# Patient Record
Sex: FEMALE | Race: WHITE | NOT HISPANIC OR LATINO | Employment: FULL TIME | URBAN - NONMETROPOLITAN AREA
[De-identification: names, ages, dates, MRNs, and addresses within clinical notes are randomized per-mention and may not be internally consistent; named-entity substitution may affect disease eponyms.]

---

## 2022-02-04 ENCOUNTER — OFFICE VISIT (OUTPATIENT)
Dept: URGENT CARE | Facility: CLINIC | Age: 42
End: 2022-02-04
Payer: COMMERCIAL

## 2022-02-04 VITALS
SYSTOLIC BLOOD PRESSURE: 134 MMHG | HEART RATE: 69 BPM | TEMPERATURE: 98.3 F | BODY MASS INDEX: 32.88 KG/M2 | WEIGHT: 222 LBS | DIASTOLIC BLOOD PRESSURE: 92 MMHG | HEIGHT: 69 IN | OXYGEN SATURATION: 98 %

## 2022-02-04 DIAGNOSIS — S09.301A INJURY OF TYMPANIC MEMBRANE OF RIGHT EAR, INITIAL ENCOUNTER: Primary | ICD-10-CM

## 2022-02-04 PROCEDURE — 99202 OFFICE O/P NEW SF 15 MIN: CPT | Performed by: PHYSICIAN ASSISTANT

## 2022-02-04 RX ORDER — AMOXICILLIN 400 MG/5ML
POWDER, FOR SUSPENSION ORAL
Qty: 140 ML | Refills: 0 | Status: SHIPPED | OUTPATIENT
Start: 2022-02-04 | End: 2022-02-11

## 2022-02-04 NOTE — PATIENT INSTRUCTIONS
Ruptured Eardrum   WHAT YOU NEED TO KNOW:   A ruptured eardrum is a tear or hole in your eardrum  DISCHARGE INSTRUCTIONS:   Medicines:   · Antibiotic ear drops  may be needed to treat or prevent an infection caused by bacteria  · Take your medicine as directed  Contact your healthcare provider if you think your medicine is not helping or if you have side effects  Tell him or her if you are allergic to any medicine  Keep a list of the medicines, vitamins, and herbs you take  Include the amounts, and when and why you take them  Bring the list or the pill bottles to follow-up visits  Carry your medicine list with you in case of an emergency  Follow up with your healthcare provider as directed:  Write down your questions so you remember to ask them during your visits  Self-care:   · Always keep your ear dry  Do not let your ear get wet, such as when bathing or swimming  Water may cause your damaged eardrum to heal more slowly and increase your risk for infection  · Do not put anything in your ear  Never put objects such as cotton swabs in your ear  Pointed objects may damage or worsen the damage to your eardrum  · Try not to blow your nose  The increase in pressure may cause further damage to your eardrum  Contact your healthcare provider if:   · You have a fever  · Your hearing loss gets worse  · You feel increased dizziness, or you are vomiting  · You have worsening ear pain or a new buzzing sound in your ear  · Your symptoms do not improve, even after you take your medicine  · You have questions or concerns about your condition or care  Return to the emergency department if:   · You are bleeding from your ear  · You cannot move or feel areas of your face  © Copyright [address] 2021 Information is for End User's use only and may not be sold, redistributed or otherwise used for commercial purposes   All illustrations and images included in CareNotes® are the copyrighted property of A D A M , Inc  or [facility] Ramon Norris   The above information is an  only  It is not intended as medical advice for individual conditions or treatments  Talk to your doctor, nurse or pharmacist before following any medical regimen to see if it is safe and effective for you

## 2022-02-04 NOTE — PROGRESS NOTES
[facility]        NAME: Oksana Willis is a 37 y o  female  : 1980    MRN: [id number]  DATE: 2022  TIME: 9:32 AM    Assessment and Plan   Injury of tympanic membrane of right ear, initial encounter [S09 301A]  1  Injury of tympanic membrane of right ear, initial encounter  amoxicillin (AMOXIL) 400 MG/5ML suspension         Patient Instructions     Start Amoxicillin to prevent infection  Follow up with ENT  Follow up with PCP in 3-5 days  Proceed to  ER if symptoms worsen  Chief Complaint     Chief Complaint   Patient presents with    Earache         History of Present Illness       Patient was cleaning her ears this morning when her cat jumped on her and pushed the Q-tip further into her right ear  Having pain and bloody drainage  Hx of previous perforation with surgical repair  Review of Systems   Review of Systems   Constitutional: Negative for chills and fever  HENT: Positive for ear discharge, ear pain and hearing loss  Current Medications       Current Outpatient Medications:     amoxicillin (AMOXIL) 400 MG/5ML suspension, 10 ml ever 12 hrs x 7 days, Disp: 140 mL, Rfl: 0    Current Allergies     Allergies as of 2022 - never reviewed   Allergen Reaction Noted    Ciprofloxacin Palpitations 10/02/2019            The following portions of the patient's history were reviewed and updated as appropriate: allergies, current medications, past family history, past medical history, past social history, past surgical history and problem list      Past Medical History:   Diagnosis Date    Anemia     Anxiety     Depression        Past Surgical History:   Procedure Laterality Date     SECTION      EAR SURGERY      HYSTERECTOMY         Family History   Problem Relation Age of Onset    COPD Mother     No Known Problems Father          Medications have been verified          Objective   /92   Pulse 69   Temp 98 3 °F (36 8 °C)   Ht 5' 9" (1 753 m)   Wt 101 kg (222 lb)   SpO2 98%   BMI 32 78 kg/m²   No LMP recorded  Physical Exam     Physical Exam  Vitals and nursing note reviewed  Constitutional:       Appearance: Normal appearance  HENT:      Head: Normocephalic and atraumatic  Left Ear: Tympanic membrane and ear canal normal       Ears:      Comments: Bloody drainage at entrance of right canal   TM partially obscured secondary to bloody drainage  Unable to visualize TM perforation, however, very likely given bloody drainage against TM  Mouth/Throat:      Mouth: Mucous membranes are moist    Eyes:      Conjunctiva/sclera: Conjunctivae normal    Cardiovascular:      Rate and Rhythm: Normal rate and regular rhythm  Pulmonary:      Effort: Pulmonary effort is normal    Skin:     General: Skin is warm  Neurological:      Mental Status: She is alert

## 2022-05-15 ENCOUNTER — OFFICE VISIT (OUTPATIENT)
Dept: URGENT CARE | Facility: CLINIC | Age: 42
End: 2022-05-15
Payer: COMMERCIAL

## 2022-05-15 VITALS
BODY MASS INDEX: 30.78 KG/M2 | HEART RATE: 88 BPM | WEIGHT: 215 LBS | OXYGEN SATURATION: 96 % | SYSTOLIC BLOOD PRESSURE: 110 MMHG | DIASTOLIC BLOOD PRESSURE: 70 MMHG | HEIGHT: 70 IN | RESPIRATION RATE: 15 BRPM | TEMPERATURE: 98.1 F

## 2022-05-15 DIAGNOSIS — J06.9 ACUTE URI: ICD-10-CM

## 2022-05-15 DIAGNOSIS — H60.391 OTHER INFECTIVE ACUTE OTITIS EXTERNA OF RIGHT EAR: ICD-10-CM

## 2022-05-15 DIAGNOSIS — H66.011 NON-RECURRENT ACUTE SUPPURATIVE OTITIS MEDIA OF RIGHT EAR WITH SPONTANEOUS RUPTURE OF TYMPANIC MEMBRANE: Primary | ICD-10-CM

## 2022-05-15 PROCEDURE — 99213 OFFICE O/P EST LOW 20 MIN: CPT

## 2022-05-15 RX ORDER — AMOXICILLIN AND CLAVULANATE POTASSIUM 875; 125 MG/1; MG/1
1 TABLET, FILM COATED ORAL EVERY 12 HOURS SCHEDULED
Qty: 14 TABLET | Refills: 0 | Status: SHIPPED | OUTPATIENT
Start: 2022-05-15 | End: 2022-05-22

## 2022-05-15 RX ORDER — OMEPRAZOLE 20 MG/1
20 CAPSULE, DELAYED RELEASE ORAL DAILY
COMMUNITY
Start: 2022-03-31 | End: 2023-03-31

## 2022-05-15 NOTE — PROGRESS NOTES
[facility]        NAME: London Hemphill is a 40 y o  female  : 1980    MRN: [id number]  DATE: May 15, 2022  TIME: 3:19 PM    Assessment and Plan   Non-recurrent acute suppurative otitis media of right ear with spontaneous rupture of tympanic membrane [H66 011]  1  Non-recurrent acute suppurative otitis media of right ear with spontaneous rupture of tympanic membrane  amoxicillin-clavulanate (AUGMENTIN) 875-125 mg per tablet    Ambulatory Referral to Otolaryngology   2  Other infective acute otitis externa of right ear  amoxicillin-clavulanate (AUGMENTIN) 875-125 mg per tablet    Ambulatory Referral to Otolaryngology   3  Acute URI       Viral symptoms most likely URI  Will treat otitis externa and otitis media with Augmentin twice a day for 7 days  Patient is allergic to ciprofloxacin thus will hold ofloxacin ear drops  Referral placed for ENT as requested  Patient Instructions   Take augmentin twice a day for 7 days  Avoid water or any objects in the ear  Follow with ENT  Follow up with PCP in 3-5 days  Proceed to  ER if symptoms worsen  Chief Complaint     Chief Complaint   Patient presents with    Nasal Congestion    Cough     1 week         History of Present Illness       Patient is a 40year old female who presents to the office today for 1 week of sinus congestion that has gotten worse with a cough and now head pressure and body aches  Does admit to working 60 hours this past week  States that the headache started 3 days ago and the coughing started last night  She also admits to ear pain  States that she did rupture her ear drum on the right few weeks ago and presented for evaluation but was unable to follow with ENT related to insurance issues  Requesting new referral for ENT  Review of Systems   Review of Systems   Constitutional: Positive for fatigue  Negative for activity change, appetite change, chills and fever     HENT: Positive for congestion, ear pain, postnasal drip and sore throat  Negative for ear discharge, facial swelling, rhinorrhea, sinus pressure and sinus pain  Respiratory: Positive for cough  Negative for shortness of breath and wheezing  Cardiovascular: Negative for chest pain and palpitations  Gastrointestinal: Negative for diarrhea, nausea and vomiting  Musculoskeletal: Positive for myalgias  Neurological: Positive for headaches  All other systems reviewed and are negative  Current Medications       Current Outpatient Medications:     amoxicillin-clavulanate (AUGMENTIN) 875-125 mg per tablet, Take 1 tablet by mouth every 12 (twelve) hours for 7 days, Disp: 14 tablet, Rfl: 0    omeprazole (PriLOSEC) 20 mg delayed release capsule, Take 20 mg by mouth in the morning  (Patient not taking: Reported on 5/15/2022), Disp: , Rfl:     Current Allergies     Allergies as of 05/15/2022 - Reviewed 05/15/2022   Allergen Reaction Noted    Cynara scolymus (artichoke) Allergic Rhinitis 10/02/2019    Ciprofloxacin Palpitations 10/02/2019            The following portions of the patient's history were reviewed and updated as appropriate: allergies, current medications, past family history, past medical history, past social history, past surgical history and problem list      Past Medical History:   Diagnosis Date    Anemia     Anxiety     Depression        Past Surgical History:   Procedure Laterality Date     SECTION      EAR SURGERY      HYSTERECTOMY         Family History   Problem Relation Age of Onset    COPD Mother     No Known Problems Father          Medications have been verified  Objective   /70   Pulse 88   Temp 98 1 °F (36 7 °C)   Resp 15   Ht 5' 9 88" (1 775 m)   Wt 97 5 kg (215 lb)   SpO2 96%   BMI 30 95 kg/m²        Physical Exam     Physical Exam  Vitals and nursing note reviewed  Constitutional:       General: She is not in acute distress  Appearance: Normal appearance   She is normal weight  She is not ill-appearing or toxic-appearing  HENT:      Right Ear: Drainage (purulent) and swelling present  Tympanic membrane is perforated and erythematous  Left Ear: Hearing and tympanic membrane normal       Ears:      Comments: Right ear canal swollen     Nose: Congestion present  Right Turbinates: Enlarged and swollen  Left Turbinates: Enlarged and swollen  Mouth/Throat:      Pharynx: Posterior oropharyngeal erythema present  Comments: Posterior pharynx erythema noted without swelling or exudate  Cardiovascular:      Rate and Rhythm: Normal rate and regular rhythm  Pulses: Normal pulses  Heart sounds: Normal heart sounds  No murmur heard  No friction rub  No gallop  Pulmonary:      Effort: Pulmonary effort is normal  No respiratory distress  Breath sounds: Normal breath sounds  No stridor  No wheezing, rhonchi or rales  Chest:      Chest wall: No tenderness  Lymphadenopathy:      Cervical: No cervical adenopathy  Skin:     General: Skin is warm and dry  Capillary Refill: Capillary refill takes less than 2 seconds  Neurological:      General: No focal deficit present  Mental Status: She is alert

## 2022-05-16 ENCOUNTER — TELEPHONE (OUTPATIENT)
Dept: OTOLARYNGOLOGY | Facility: CLINIC | Age: 42
End: 2022-05-16

## 2022-05-16 ENCOUNTER — OFFICE VISIT (OUTPATIENT)
Dept: URGENT CARE | Facility: CLINIC | Age: 42
End: 2022-05-16
Payer: COMMERCIAL

## 2022-05-16 VITALS
TEMPERATURE: 98.2 F | HEART RATE: 89 BPM | DIASTOLIC BLOOD PRESSURE: 97 MMHG | SYSTOLIC BLOOD PRESSURE: 137 MMHG | RESPIRATION RATE: 20 BRPM | OXYGEN SATURATION: 96 %

## 2022-05-16 DIAGNOSIS — R05.9 COUGH: Primary | ICD-10-CM

## 2022-05-16 DIAGNOSIS — R52 BODY ACHES: ICD-10-CM

## 2022-05-16 DIAGNOSIS — U07.1 COVID-19: Primary | ICD-10-CM

## 2022-05-16 PROCEDURE — 99213 OFFICE O/P EST LOW 20 MIN: CPT

## 2022-05-16 PROCEDURE — U0003 INFECTIOUS AGENT DETECTION BY NUCLEIC ACID (DNA OR RNA); SEVERE ACUTE RESPIRATORY SYNDROME CORONAVIRUS 2 (SARS-COV-2) (CORONAVIRUS DISEASE [COVID-19]), AMPLIFIED PROBE TECHNIQUE, MAKING USE OF HIGH THROUGHPUT TECHNOLOGIES AS DESCRIBED BY CMS-2020-01-R: HCPCS

## 2022-05-16 PROCEDURE — U0005 INFEC AGEN DETEC AMPLI PROBE: HCPCS

## 2022-05-16 NOTE — PROGRESS NOTES
[facility]        NAME: Susie Corral is a 40 y o  female  : 1980    MRN: [id number]  DATE: May 16, 2022  TIME: 9:23 AM    Assessment and Plan   COVID-19 [U07 1]  1  COVID-19       Positive test at work  Work requires PCR test to confirm  Patient Instructions   Take 1g of vitamin C twice a day  Take 2000 international units of Vit D2 daily  take a multivitamin once daily  Call PCP to discuss results  Continue augmentin for otitis media/externa of right ear  You are currently being tested for COVID-19  The test results take approximately 48-72 hours to return  Please quarantine until these test results are back  The results are available immediately via [address]  I will call you with any positive results and if the results are unseen  Try to self isolate in the home, may wear a mask at home  The most accurate result is 24-48 hours after the onset of symptoms  If the test result is negative and you have tested prior to this time , it may be too early and retesting may need to occur if your symptoms persist     Clean high touch surfaces after use including the bathroom  Practice proper cough etiquette and good hand hygiene  Dispose of used tissues immediately  May use an over-the-counter saline nasal spray, Mucinex as directed on the bottles for congestion as needed  May use hot showers to steam up bathroom and enter into the steaming room to help with congestion  May use Vicks in humidifier, or vapor rub, or drops in shower or tub to help with congestion  May use a cool mist or warm mist humidifier to help thin out secretions  May continue taking over-the-counter Tylenol and ibuprofen as directed on the bottle as needed for fever body aches  Ensure you are drinking plenty of fluids  Honey is a natural cough suppressant and will help soothe the sore throat  Honey should not be given to pregnant women or children under the age of 3years old    May also use over-the-counter cough drops or Cepacol as needed for sore throat  Follow with your PCP in 3-5 days  If symptoms worsen proceed to the ED  Follow up with PCP in 3-5 days  Proceed to  ER if symptoms worsen  Chief Complaint     Chief Complaint   Patient presents with    Cold Like Symptoms     + COVID at work today  Headache, fever, chills, body aches  Started Augmentin yesterday  History of Present Illness       Patient is a 40year old female who presents to the office today s/p positive COVID test at work  States that she was exposed to someone on Friday and forgot to mention it to the provider she saw yesterday  States she started today with diarrhea and believed it was due to the augmentin  Review of Systems   Review of Systems   Constitutional: Positive for chills and fever  Negative for activity change, appetite change, diaphoresis and fatigue  HENT: Positive for congestion, ear pain, rhinorrhea and sinus pressure  Negative for sinus pain and sore throat  Respiratory: Negative for cough, shortness of breath and wheezing  Cardiovascular: Negative for chest pain and palpitations  Gastrointestinal: Positive for diarrhea  Negative for nausea and vomiting  Musculoskeletal: Positive for myalgias  Neurological: Positive for headaches  All other systems reviewed and are negative          Current Medications       Current Outpatient Medications:     amoxicillin-clavulanate (AUGMENTIN) 875-125 mg per tablet, Take 1 tablet by mouth every 12 (twelve) hours for 7 days, Disp: 14 tablet, Rfl: 0    omeprazole (PriLOSEC) 20 mg delayed release capsule, Take 20 mg by mouth in the morning , Disp: , Rfl:     Current Allergies     Allergies as of 05/16/2022 - Reviewed 05/16/2022   Allergen Reaction Noted    Cynara scolymus (artichoke) Allergic Rhinitis 10/02/2019    Ciprofloxacin Palpitations 10/02/2019            The following portions of the patient's history were reviewed and updated as appropriate: allergies, current medications, past family history, past medical history, past social history, past surgical history and problem list      Past Medical History:   Diagnosis Date    Anemia     Anxiety     Depression        Past Surgical History:   Procedure Laterality Date     SECTION      EAR SURGERY      HYSTERECTOMY         Family History   Problem Relation Age of Onset    COPD Mother     No Known Problems Father          Medications have been verified  Objective   /97   Pulse 89   Temp 98 2 °F (36 8 °C)   Resp 20   SpO2 96%        Physical Exam     Physical Exam  Nursing note reviewed  Constitutional:       General: She is not in acute distress  Appearance: She is normal weight  She is ill-appearing  She is not toxic-appearing or diaphoretic  HENT:      Right Ear: Hearing normal  Drainage (purulent) and swelling present  Tympanic membrane is perforated and erythematous  Left Ear: Hearing, tympanic membrane, ear canal and external ear normal       Ears:      Comments: Right ear canal swelling     Nose: Congestion present  Cardiovascular:      Rate and Rhythm: Normal rate and regular rhythm  Pulses: Normal pulses  Heart sounds: Normal heart sounds  No murmur heard  No friction rub  No gallop  Pulmonary:      Effort: Pulmonary effort is normal  No respiratory distress  Breath sounds: Normal breath sounds  No stridor  No wheezing, rhonchi or rales  Chest:      Chest wall: No tenderness  Abdominal:      Palpations: Abdomen is soft  Tenderness: There is no guarding  Lymphadenopathy:      Cervical: No cervical adenopathy  Skin:     General: Skin is warm and dry  Capillary Refill: Capillary refill takes less than 2 seconds  Neurological:      General: No focal deficit present  Mental Status: She is alert and oriented to person, place, and time  Mental status is at baseline

## 2022-05-16 NOTE — TELEPHONE ENCOUNTER
I tried to call the patient but number you have dialed is not in service anymore to schedule ENT consult

## 2022-05-16 NOTE — PATIENT INSTRUCTIONS
Take 1g of vitamin C twice a day  Take 2000 international units of Vit D2 daily  take a multivitamin once daily  Call PCP to discuss results  Continue augmentin for otitis media/externa of right ear  You are currently being tested for COVID-19  The test results take approximately 48-72 hours to return  Please quarantine until these test results are back  The results are available immediately via [address]  I will call you with any positive results and if the results are unseen  Try to self isolate in the home, may wear a mask at home  The most accurate result is 24-48 hours after the onset of symptoms  If the test result is negative and you have tested prior to this time , it may be too early and retesting may need to occur if your symptoms persist     Clean high touch surfaces after use including the bathroom  Practice proper cough etiquette and good hand hygiene  Dispose of used tissues immediately  May use an over-the-counter saline nasal spray, Mucinex as directed on the bottles for congestion as needed  May use hot showers to steam up bathroom and enter into the steaming room to help with congestion  May use Vicks in humidifier, or vapor rub, or drops in shower or tub to help with congestion  May use a cool mist or warm mist humidifier to help thin out secretions  May continue taking over-the-counter Tylenol and ibuprofen as directed on the bottle as needed for fever body aches  Ensure you are drinking plenty of fluids  Honey is a natural cough suppressant and will help soothe the sore throat  Honey should not be given to pregnant women or children under the age of 3years old  May also use over-the-counter cough drops or Cepacol as needed for sore throat  Follow with your PCP in 3-5 days  If symptoms worsen proceed to the ED

## 2022-05-16 NOTE — LETTER
[facility] [address] [address]  Dept: [phone]    May 16, 2022    Patient: Pat Ramirez  YOB: 1980    Pat Ramirez was seen and evaluated at our [facility]  Please note if Covid and Flu tests are negative, they may return to work when fever free for 24 hours without the use of a fever reducing agent  If Covid or Flu test is positive, they may return to work on 5/19/22, as this is 5 days from the onset of symptoms  If continue with fever must stay out until fever free for 24 hours without the use of antipyretics  Upon return, they must then adhere to strict masking for an additional 5 days (5/24/2022)      Sincerely,    [email or web address]

## 2022-05-17 LAB — SARS-COV-2 RNA RESP QL NAA+PROBE: POSITIVE

## 2022-05-18 ENCOUNTER — TELEPHONE (OUTPATIENT)
Dept: PALLIATIVE MEDICINE | Facility: CLINIC | Age: 42
End: 2022-05-18

## 2024-02-01 ENCOUNTER — OFFICE VISIT (OUTPATIENT)
Dept: URGENT CARE | Facility: CLINIC | Age: 44
End: 2024-02-01
Payer: COMMERCIAL

## 2024-02-01 VITALS
TEMPERATURE: 98 F | OXYGEN SATURATION: 98 % | HEIGHT: 69 IN | DIASTOLIC BLOOD PRESSURE: 125 MMHG | HEART RATE: 79 BPM | RESPIRATION RATE: 18 BRPM | WEIGHT: 235 LBS | BODY MASS INDEX: 34.8 KG/M2 | SYSTOLIC BLOOD PRESSURE: 143 MMHG

## 2024-02-01 DIAGNOSIS — H66.002 NON-RECURRENT ACUTE SUPPURATIVE OTITIS MEDIA OF LEFT EAR WITHOUT SPONTANEOUS RUPTURE OF TYMPANIC MEMBRANE: Primary | ICD-10-CM

## 2024-02-01 PROCEDURE — 99213 OFFICE O/P EST LOW 20 MIN: CPT | Performed by: STUDENT IN AN ORGANIZED HEALTH CARE EDUCATION/TRAINING PROGRAM

## 2024-02-01 RX ORDER — AMOXICILLIN 500 MG/1
1000 CAPSULE ORAL EVERY 8 HOURS SCHEDULED
Qty: 30 CAPSULE | Refills: 0 | Status: SHIPPED | OUTPATIENT
Start: 2024-02-01 | End: 2024-02-06

## 2024-02-01 NOTE — LETTER
February 1, 2024     Patient: Samira Nayak   YOB: 1980   Date of Visit: 2/1/2024       To Whom it May Concern:    Samira Nayak was seen in my clinic on 2/1/2024. Please excuse from work tonight. Please accommodate for headphones instead of earphones for work.     If you have any questions or concerns, please don't hesitate to call.         Sincerely,          Lakisha Mcdaniel, DO        CC: No Recipients

## 2024-02-02 NOTE — PROGRESS NOTES
"  [facility]        NAME: Samira Nayak is a 43 y.o. female  : 1980    MRN: [id number]    Assessment and Plan   Non-recurrent acute suppurative otitis media of left ear without spontaneous rupture of tympanic membrane [H66.002]  1. Non-recurrent acute suppurative otitis media of left ear without spontaneous rupture of tympanic membrane  amoxicillin (AMOXIL) 500 mg capsule        Will treat unilateral purulent otitis media with amoxicillin.  Right auditory canal appears very dry so provided patient with a work note stating that she should be given headphones history of insert earphones to prevent further irritation.    Patient Instructions     See wrap up for details  Follow up with PCP in 3-5 days.  Proceed to  ER if symptoms worsen.    Chief Complaint     Chief Complaint   Patient presents with    Earache     Bilateral ear pain and the left is worse it's all down the side of her face. Symptoms first started yesterday          History of Present Illness   BP (!) 143/125   Pulse 79   Temp 98 °F (36.7 °C)   Resp 18   Ht 5' 9\" (1.753 m)   Wt 107 kg (235 lb)   SpO2 98%   BMI 34.70 kg/m²     HPI    Patient presents with bilateral ear pain since yesterday, left is worse than the right.  She is also feeling pain radiate to jaw.  Denies fever, chills, cough, sore throat.    Review of Systems   Review of Systems   Constitutional:  Negative for chills and fever.   HENT:  Positive for ear pain. Negative for sore throat.    Eyes:  Negative for pain and visual disturbance.   Respiratory:  Negative for cough, chest tightness and shortness of breath.    Cardiovascular:  Negative for chest pain and palpitations.   Gastrointestinal:  Negative for abdominal pain, constipation, diarrhea, nausea and vomiting.   Genitourinary:  Negative for dysuria, hematuria and menstrual problem.   Musculoskeletal:  Negative for arthralgias and back pain.   Skin:  Negative for color change and rash.   Neurological:  Negative " "for seizures and syncope.   Psychiatric/Behavioral:  Negative for dysphoric mood and suicidal ideas.    All other systems reviewed and are negative.      Current Medications       Current Outpatient Medications:     amoxicillin (AMOXIL) 500 mg capsule, Take 2 capsules (1,000 mg total) by mouth every 8 (eight) hours for 5 days, Disp: 30 capsule, Rfl: 0    omeprazole (PriLOSEC) 20 mg delayed release capsule, Take 20 mg by mouth in the morning., Disp: , Rfl:     Current Allergies     Allergies as of 2024 - Reviewed 2024   Allergen Reaction Noted    Cynara scolymus (artichoke) Allergic Rhinitis 10/02/2019    Ciprofloxacin Palpitations 10/02/2019            The following portions of the patient's history were reviewed and updated as appropriate: allergies, current medications, past family history, past medical history, past social history, past surgical history and problem list.     Past Medical History:   Diagnosis Date    Anemia     Anxiety     Depression        Past Surgical History:   Procedure Laterality Date     SECTION      EAR SURGERY      HYSTERECTOMY         Family History   Problem Relation Age of Onset    COPD Mother     No Known Problems Father          Medications have been verified.        Objective   BP (!) 143/125   Pulse 79   Temp 98 °F (36.7 °C)   Resp 18   Ht 5' 9\" (1.753 m)   Wt 107 kg (235 lb)   SpO2 98%   BMI 34.70 kg/m²        Physical Exam     Physical Exam  Constitutional:       General: She is not in acute distress.     Appearance: Normal appearance.   HENT:      Head: Normocephalic and atraumatic.      Right Ear: A middle ear effusion (Clear) is present. Tympanic membrane is not erythematous or bulging.      Left Ear: A middle ear effusion (Purulent) is present. Tympanic membrane is erythematous and bulging.      Ears:      Comments: Right auditory canal is very dry     Mouth/Throat:      Mouth: Mucous membranes are moist.      Pharynx: Oropharynx is clear. No " posterior oropharyngeal erythema.   Eyes:      Extraocular Movements: Extraocular movements intact.      Conjunctiva/sclera: Conjunctivae normal.   Cardiovascular:      Rate and Rhythm: Normal rate.   Pulmonary:      Effort: Pulmonary effort is normal. No respiratory distress.   Skin:     General: Skin is warm and dry.   Neurological:      General: No focal deficit present.      Mental Status: She is alert and oriented to person, place, and time.   Psychiatric:         Mood and Affect: Mood normal.         Behavior: Behavior normal.